# Patient Record
Sex: FEMALE | Race: BLACK OR AFRICAN AMERICAN | HISPANIC OR LATINO
[De-identification: names, ages, dates, MRNs, and addresses within clinical notes are randomized per-mention and may not be internally consistent; named-entity substitution may affect disease eponyms.]

---

## 2017-01-18 ENCOUNTER — TRANSCRIPTION ENCOUNTER (OUTPATIENT)
Age: 47
End: 2017-01-18

## 2018-05-25 ENCOUNTER — APPOINTMENT (OUTPATIENT)
Dept: ORTHOPEDIC SURGERY | Facility: CLINIC | Age: 48
End: 2018-05-25
Payer: COMMERCIAL

## 2018-05-25 VITALS
DIASTOLIC BLOOD PRESSURE: 83 MMHG | HEART RATE: 84 BPM | WEIGHT: 222 LBS | HEIGHT: 67 IN | SYSTOLIC BLOOD PRESSURE: 132 MMHG | BODY MASS INDEX: 34.84 KG/M2

## 2018-05-25 DIAGNOSIS — M79.662 PAIN IN LEFT LOWER LEG: ICD-10-CM

## 2018-05-25 DIAGNOSIS — Z86.39 PERSONAL HISTORY OF OTHER ENDOCRINE, NUTRITIONAL AND METABOLIC DISEASE: ICD-10-CM

## 2018-05-25 DIAGNOSIS — Z86.2 PERSONAL HISTORY OF DISEASES OF THE BLOOD AND BLOOD-FORMING ORGANS AND CERTAIN DISORDERS INVOLVING THE IMMUNE MECHANISM: ICD-10-CM

## 2018-05-25 DIAGNOSIS — Z78.9 OTHER SPECIFIED HEALTH STATUS: ICD-10-CM

## 2018-05-25 PROCEDURE — 99204 OFFICE O/P NEW MOD 45 MIN: CPT

## 2018-05-25 RX ORDER — DICLOFENAC SODIUM 20 MG/G
2 SOLUTION TOPICAL
Qty: 1 | Refills: 0 | Status: ACTIVE | COMMUNITY
Start: 2018-05-25 | End: 1900-01-01

## 2018-05-25 RX ORDER — DICLOFENAC SODIUM 50 MG/1
50 TABLET, DELAYED RELEASE ORAL
Qty: 60 | Refills: 0 | Status: ACTIVE | COMMUNITY
Start: 2018-05-25 | End: 1900-01-01

## 2018-07-18 ENCOUNTER — EMERGENCY (EMERGENCY)
Facility: HOSPITAL | Age: 48
LOS: 1 days | Discharge: HOME | End: 2018-07-18
Attending: EMERGENCY MEDICINE | Admitting: EMERGENCY MEDICINE
Payer: COMMERCIAL

## 2018-07-18 VITALS
SYSTOLIC BLOOD PRESSURE: 123 MMHG | HEART RATE: 89 BPM | OXYGEN SATURATION: 99 % | TEMPERATURE: 98 F | RESPIRATION RATE: 17 BRPM | DIASTOLIC BLOOD PRESSURE: 81 MMHG

## 2018-07-18 VITALS
HEART RATE: 89 BPM | RESPIRATION RATE: 16 BRPM | OXYGEN SATURATION: 99 % | DIASTOLIC BLOOD PRESSURE: 88 MMHG | SYSTOLIC BLOOD PRESSURE: 117 MMHG | TEMPERATURE: 98 F

## 2018-07-18 DIAGNOSIS — Z90.710 ACQUIRED ABSENCE OF BOTH CERVIX AND UTERUS: Chronic | ICD-10-CM

## 2018-07-18 LAB
ALBUMIN SERPL ELPH-MCNC: 4.2 G/DL — SIGNIFICANT CHANGE UP (ref 3.3–5)
ALP SERPL-CCNC: 84 U/L — SIGNIFICANT CHANGE UP (ref 40–120)
ALT FLD-CCNC: 19 U/L — SIGNIFICANT CHANGE UP (ref 4–33)
AST SERPL-CCNC: 25 U/L — SIGNIFICANT CHANGE UP (ref 4–32)
BASOPHILS # BLD AUTO: 0.02 K/UL — SIGNIFICANT CHANGE UP (ref 0–0.2)
BASOPHILS NFR BLD AUTO: 0.3 % — SIGNIFICANT CHANGE UP (ref 0–2)
BILIRUB SERPL-MCNC: 0.2 MG/DL — SIGNIFICANT CHANGE UP (ref 0.2–1.2)
BUN SERPL-MCNC: 11 MG/DL — SIGNIFICANT CHANGE UP (ref 7–23)
CALCIUM SERPL-MCNC: 9.2 MG/DL — SIGNIFICANT CHANGE UP (ref 8.4–10.5)
CHLORIDE SERPL-SCNC: 102 MMOL/L — SIGNIFICANT CHANGE UP (ref 98–107)
CO2 SERPL-SCNC: 25 MMOL/L — SIGNIFICANT CHANGE UP (ref 22–31)
CREAT SERPL-MCNC: 0.95 MG/DL — SIGNIFICANT CHANGE UP (ref 0.5–1.3)
EOSINOPHIL # BLD AUTO: 0.12 K/UL — SIGNIFICANT CHANGE UP (ref 0–0.5)
EOSINOPHIL NFR BLD AUTO: 1.6 % — SIGNIFICANT CHANGE UP (ref 0–6)
GLUCOSE SERPL-MCNC: 162 MG/DL — HIGH (ref 70–99)
HCT VFR BLD CALC: 37.3 % — SIGNIFICANT CHANGE UP (ref 34.5–45)
HGB BLD-MCNC: 11.9 G/DL — SIGNIFICANT CHANGE UP (ref 11.5–15.5)
IMM GRANULOCYTES # BLD AUTO: 0.03 # — SIGNIFICANT CHANGE UP
IMM GRANULOCYTES NFR BLD AUTO: 0.4 % — SIGNIFICANT CHANGE UP (ref 0–1.5)
LIDOCAIN IGE QN: 25.3 U/L — SIGNIFICANT CHANGE UP (ref 7–60)
LYMPHOCYTES # BLD AUTO: 0.6 K/UL — LOW (ref 1–3.3)
LYMPHOCYTES # BLD AUTO: 8.2 % — LOW (ref 13–44)
MAGNESIUM SERPL-MCNC: 2 MG/DL — SIGNIFICANT CHANGE UP (ref 1.6–2.6)
MCHC RBC-ENTMCNC: 27 PG — SIGNIFICANT CHANGE UP (ref 27–34)
MCHC RBC-ENTMCNC: 31.9 % — LOW (ref 32–36)
MCV RBC AUTO: 84.8 FL — SIGNIFICANT CHANGE UP (ref 80–100)
MONOCYTES # BLD AUTO: 0.63 K/UL — SIGNIFICANT CHANGE UP (ref 0–0.9)
MONOCYTES NFR BLD AUTO: 8.6 % — SIGNIFICANT CHANGE UP (ref 2–14)
NEUTROPHILS # BLD AUTO: 5.91 K/UL — SIGNIFICANT CHANGE UP (ref 1.8–7.4)
NEUTROPHILS NFR BLD AUTO: 80.9 % — HIGH (ref 43–77)
NRBC # FLD: 0 — SIGNIFICANT CHANGE UP
PHOSPHATE SERPL-MCNC: 2.1 MG/DL — LOW (ref 2.5–4.5)
PLATELET # BLD AUTO: 234 K/UL — SIGNIFICANT CHANGE UP (ref 150–400)
PMV BLD: 12.3 FL — SIGNIFICANT CHANGE UP (ref 7–13)
POTASSIUM SERPL-MCNC: 5 MMOL/L — SIGNIFICANT CHANGE UP (ref 3.5–5.3)
POTASSIUM SERPL-SCNC: 5 MMOL/L — SIGNIFICANT CHANGE UP (ref 3.5–5.3)
PROT SERPL-MCNC: 7.3 G/DL — SIGNIFICANT CHANGE UP (ref 6–8.3)
RBC # BLD: 4.4 M/UL — SIGNIFICANT CHANGE UP (ref 3.8–5.2)
RBC # FLD: 14.3 % — SIGNIFICANT CHANGE UP (ref 10.3–14.5)
SODIUM SERPL-SCNC: 139 MMOL/L — SIGNIFICANT CHANGE UP (ref 135–145)
WBC # BLD: 7.31 K/UL — SIGNIFICANT CHANGE UP (ref 3.8–10.5)
WBC # FLD AUTO: 7.31 K/UL — SIGNIFICANT CHANGE UP (ref 3.8–10.5)

## 2018-07-18 PROCEDURE — 93010 ELECTROCARDIOGRAM REPORT: CPT

## 2018-07-18 PROCEDURE — 99284 EMERGENCY DEPT VISIT MOD MDM: CPT | Mod: 25

## 2018-07-18 RX ORDER — LEVOTHYROXINE SODIUM 125 MCG
1 TABLET ORAL
Qty: 0 | Refills: 0 | COMMUNITY

## 2018-07-18 RX ORDER — FAMOTIDINE 10 MG/ML
20 INJECTION INTRAVENOUS DAILY
Qty: 0 | Refills: 0 | Status: DISCONTINUED | OUTPATIENT
Start: 2018-07-18 | End: 2018-07-18

## 2018-07-18 RX ORDER — METOCLOPRAMIDE HCL 10 MG
10 TABLET ORAL ONCE
Qty: 0 | Refills: 0 | Status: COMPLETED | OUTPATIENT
Start: 2018-07-18 | End: 2018-07-18

## 2018-07-18 RX ORDER — SODIUM,POTASSIUM PHOSPHATES 278-250MG
2 POWDER IN PACKET (EA) ORAL ONCE
Qty: 0 | Refills: 0 | Status: DISCONTINUED | OUTPATIENT
Start: 2018-07-18 | End: 2018-07-18

## 2018-07-18 RX ORDER — FAMOTIDINE 10 MG/ML
1 INJECTION INTRAVENOUS
Qty: 14 | Refills: 0 | OUTPATIENT
Start: 2018-07-18 | End: 2018-07-31

## 2018-07-18 RX ORDER — ACETAMINOPHEN 500 MG
650 TABLET ORAL ONCE
Qty: 0 | Refills: 0 | Status: COMPLETED | OUTPATIENT
Start: 2018-07-18 | End: 2018-07-18

## 2018-07-18 RX ADMIN — Medication 650 MILLIGRAM(S): at 03:32

## 2018-07-18 RX ADMIN — Medication 30 MILLILITER(S): at 02:29

## 2018-07-18 RX ADMIN — Medication 10 MILLIGRAM(S): at 02:29

## 2018-07-18 RX ADMIN — FAMOTIDINE 20 MILLIGRAM(S): 10 INJECTION INTRAVENOUS at 02:29

## 2018-07-18 NOTE — ED PROVIDER NOTE - MEDICAL DECISION MAKING DETAILS
Dalia PGY3: 47F hx as above presents with abd pain with benign abd exam, likely 2/2 GERD.  Will hydrate, tx symptoms.  Consider imaging. Dalia PGY3: 47F hx as above presents with abd pain with benign abd exam, likely 2/2 GERD.  Also consider cholecystitis, pancreatitis, atypical presentation of ACS.  Check labs.  Will hydrate, tx symptoms.  Consider imaging.

## 2018-07-18 NOTE — ED PROVIDER NOTE - PMH
Graves disease    Hypothyroidism due to medication    Pernicious anemia Atrophic gastritis    Graves disease    Hypothyroidism due to medication    Pernicious anemia

## 2018-07-18 NOTE — ED PROVIDER NOTE - NS ED ROS FT
Gen: denies fever, chills  HENT: denies throat pain, nasal congestion  Eye: denies changes in vision, eye pain  CV: denies chest pain, palpitations  Pulm: denies SOB, cough  Abd: + abd pain, N/V, denies D/C  : denies hematuria, dysuria  Skin: denies rash, itching  Ext: denies LE edema, pain  Neuro: denies HA, dizziness, lightheadedness

## 2018-07-18 NOTE — ED PROVIDER NOTE - ATTENDING CONTRIBUTION TO CARE
47F PMh gastritis, PUD, fibroids s/p hysterectomy, hypothyriod p/w epigastric pain for several hrs, NV. No other systemic symptoms. Vitals wnl, exam as above.  ddx: Likely GERD/gastritis/PUD vs. less likely pancreatitis. clinically not ACS, PE, tamponade, dissection, PTX, perf, myocarditis, mediastinitis.   CBC, cmp, lipase, symptom control, reassess.

## 2018-07-18 NOTE — ED PROVIDER NOTE - PLAN OF CARE
1) Please return to the ED should you have any new or worsening symptoms, worsening pain, develop bloody 1) Please return to the ED should you have any new or worsening symptoms, worsening pain, develop bloody vomiting, bloody stools.  2) Please follow up with your primary doctor in the next 2 weeks.

## 2018-07-18 NOTE — ED ADULT NURSE NOTE - OBJECTIVE STATEMENT
pt arrives a*ox3 amb to room 25 c/o non radiating epigastric abdominal pain that woke her from her sleep at 2300, patient denies any cp or sob, appears comfortable and in nad, resps even and unlabored, 20g ivsl placed labs drawn and sent, md to [facility] closely. pmh hysterectomy and hypothyroid.

## 2018-07-18 NOTE — ED PROVIDER NOTE - FAMILY HISTORY
Mother  Still living? Unknown  Family history of hypertension in mother, Age at diagnosis: Age Unknown     Father  Still living? Unknown  Family history of diabetes mellitus in father, Age at diagnosis: Age Unknown

## 2018-07-18 NOTE — ED PROVIDER NOTE - OBJECTIVE STATEMENT
47F h/o hypothyroidism, uterine fibroids s/p hysterectomy who presents with abdominal pain.    The patient reports 47F h/o hypothyroidism, uterine fibroids s/p hysterectomy, pernicious anemia who presents with abdominal pain.    The patient reports 47F h/o hypothyroidism, uterine fibroids s/p hysterectomy, pernicious anemia, atrophic gastritis who presents with abdominal pain.    The patient reports    PCP: Luann Mazariegos 47F h/o hypothyroidism, uterine fibroids s/p hysterectomy, pernicious anemia, atrophic gastritis who presents with abdominal pain.    The patient reports sudden onset epigastric pain that started around 10-11pm today that woke her from sleep.  She states that the pain is in the central epigastric region, no radiation, pressing in nature, not worsened with exertion, severity 10/10 at its worst and currently 3/10.  The patient had nausea with one episode of emesis that was food-like in consistency without blood.  She denies diarrhea / constipation.  She has a h/o atrophic gastritis but does not take medications - she will take famotidine or tums PRN for pain, which help.  She denies any new restaurants or new food.  She had a hamburger for dinner.  She states that she drinks a lot of coffee but that it has not triggered her gastritis in the past.    PCP: Luann Mazariegos 47F h/o hypothyroidism, uterine fibroids s/p hysterectomy, pernicious anemia, atrophic gastritis who presents with abdominal pain.  The patient reports sudden onset epigastric pain that started around 10-11pm today that woke her from sleep.  She states that the pain is in the central epigastric region, no radiation, pressing in nature, not worsened with exertion, severity 10/10 at its worst and currently 3/10.  The patient had nausea with one episode of emesis that was food-like in consistency without blood.  She denies diarrhea / constipation.  She has a h/o atrophic gastritis but does not take medications - she will take famotidine or tums PRN for pain, which help.  She denies any new restaurants or new food.  She had a hamburger for dinner.  She states that she drinks a lot of coffee but that it has not triggered her gastritis in the past.  PCP: Luann Nixfish: 47F PMh gastritis, PUD, fibroids s/p hysterectomy, hypothyriod p/w epigastric pain for several hrs, intermittent, +similar to prior when diagnosed w/ ulcer. NBNB NV x1. Denies f/c, diarrhea, black/bloody stool, urinary complaints, weakness/numbness, back pain, rashes, recent travel, sick contacts, SOB/CP. normal BMs.    Last EGD ~1yr ago, cant recall name of GI.

## 2018-07-18 NOTE — ED ADULT TRIAGE NOTE - CHIEF COMPLAINT QUOTE
pt. w/ hx. hypothyroidism c/o non- radiating  epigastric pain since 11pm. Pt. states pain woke her up from her sleep. Pt. states since then has had 1x episode of vomiting , and is currently nauseous. Pt. appears uncomfortable in triage . EKG in progress.

## 2018-07-18 NOTE — ED PROVIDER NOTE - PHYSICAL EXAMINATION
T(C): 36.7 (07-18-18 @ 02:32), Max: 36.7 (07-18-18 @ 01:39)  HR: 90 (07-18-18 @ 02:32) (89 - 90)  BP: 121/87 (07-18-18 @ 02:32) (117/88 - 121/87)  RR: 18 (07-18-18 @ 02:32) (16 - 18)  SpO2: 99% (07-18-18 @ 02:32) (99% - 99%)    Gen: awake, alert  HENT: neck soft / supple; MM slightly dry  Lymph: no LAD noted in neck  Eye: PERRL, sclerae anicteric  CV: normal rate, regular rhythm  Pulm: CTAB, no w/r/r auscultated  Abd: +BS, soft, NT, ND  Skin: warm, dry  Ext: no LE edema  Neuro: answering questions appropriately, following commands appropriately, recent and remote memory intact  Psych: normal mood / affect

## 2018-07-18 NOTE — ED PROVIDER NOTE - PROGRESS NOTE DETAILS
Dalia PGY3: Re-assessed pt.  Pt reports feeling much improved, very minimal abd pain.  Likely d/c home. Klepfish: Asymptomatic, abd soft ntnd, labs grossly wnl, given copy of results, comfortable for dc, outpt pmd f/u.

## 2018-07-18 NOTE — ED PROVIDER NOTE - CARE PLAN
Principal Discharge DX:	Abdominal pain  Assessment and plan of treatment:	1) Please return to the ED should you have any new or worsening symptoms, worsening pain, develop bloody Principal Discharge DX:	Abdominal pain  Assessment and plan of treatment:	1) Please return to the ED should you have any new or worsening symptoms, worsening pain, develop bloody vomiting, bloody stools.  2) Please follow up with your primary doctor in the next 2 weeks.

## 2018-09-05 PROBLEM — K29.40 CHRONIC ATROPHIC GASTRITIS WITHOUT BLEEDING: Chronic | Status: ACTIVE | Noted: 2018-07-18

## 2018-10-11 ENCOUNTER — APPOINTMENT (OUTPATIENT)
Dept: GASTROENTEROLOGY | Facility: CLINIC | Age: 48
End: 2018-10-11
Payer: COMMERCIAL

## 2018-10-11 VITALS
RESPIRATION RATE: 16 BRPM | TEMPERATURE: 98.1 F | BODY MASS INDEX: 35 KG/M2 | DIASTOLIC BLOOD PRESSURE: 71 MMHG | SYSTOLIC BLOOD PRESSURE: 143 MMHG | HEIGHT: 67 IN | WEIGHT: 223 LBS | HEART RATE: 84 BPM | OXYGEN SATURATION: 100 %

## 2018-10-11 PROCEDURE — 99204 OFFICE O/P NEW MOD 45 MIN: CPT

## 2019-04-08 ENCOUNTER — RESULT REVIEW (OUTPATIENT)
Age: 49
End: 2019-04-08

## 2019-06-06 ENCOUNTER — APPOINTMENT (OUTPATIENT)
Dept: GASTROENTEROLOGY | Facility: CLINIC | Age: 49
End: 2019-06-06
Payer: COMMERCIAL

## 2019-06-06 VITALS
DIASTOLIC BLOOD PRESSURE: 89 MMHG | HEIGHT: 67 IN | OXYGEN SATURATION: 99 % | WEIGHT: 212 LBS | SYSTOLIC BLOOD PRESSURE: 138 MMHG | HEART RATE: 82 BPM | TEMPERATURE: 98.6 F | BODY MASS INDEX: 33.27 KG/M2

## 2019-06-06 PROCEDURE — 99215 OFFICE O/P EST HI 40 MIN: CPT

## 2019-06-06 NOTE — PHYSICAL EXAM
[General Appearance - Alert] : alert [General Appearance - In No Acute Distress] : in no acute distress [Sclera] : the sclera and conjunctiva were normal [Extraocular Movements] : extraocular movements were intact [PERRL With Normal Accommodation] : pupils were equal in size, round, and reactive to light [Outer Ear] : the ears and nose were normal in appearance [Oropharynx] : the oropharynx was normal [Neck Appearance] : the appearance of the neck was normal [Auscultation Breath Sounds / Voice Sounds] : lungs were clear to auscultation bilaterally [Heart Rate And Rhythm] : heart rate was normal and rhythm regular [Heart Sounds] : normal S1 and S2 [Heart Sounds Gallop] : no gallops [Murmurs] : no murmurs [Heart Sounds Pericardial Friction Rub] : no pericardial rub [Bowel Sounds] : normal bowel sounds [Abdomen Soft] : soft [Abdomen Tenderness] : non-tender [Abdomen Mass (___ Cm)] : no abdominal mass palpated [Cervical Lymph Nodes Enlarged Posterior Bilaterally] : posterior cervical [Cervical Lymph Nodes Enlarged Anterior Bilaterally] : anterior cervical [Supraclavicular Lymph Nodes Enlarged Bilaterally] : supraclavicular [Nail Clubbing] : no clubbing  or cyanosis of the fingernails [Oriented To Time, Place, And Person] : oriented to person, place, and time [] : no rash [Impaired Insight] : insight and judgment were intact [Affect] : the affect was normal

## 2019-06-06 NOTE — HISTORY OF PRESENT ILLNESS
[de-identified] : 48 year old female presents for follow up.  1) She reports  a history of intestinal metaplasia on an EGD and was told by Dr. Larios to have  repeat EGD. 2) Her GERD has drastically improved with diet change and weight loss. Still, she complains of occasional symptoms despite talking Prilosec occasionally 3) She complains of occasional constipation and rectal pain.

## 2019-06-06 NOTE — ASSESSMENT
[FreeTextEntry1] : 48 year old female presents for follow up\par \par 1) For her IM she will follow up with [facility] for EGD and intestinal mapping \par \par 2) For HEr GERD : I explained how PPI and H2 blockers. I explained for immediate relief she should take a H2 blocker \par \par 3) Constipation: \par Colace+ miralax+ Benefiber as needed. \par \par RTO as needed

## 2019-11-08 NOTE — ED PROVIDER NOTE - RADIATION
no radiation You can access the FollowMyHealth Patient Portal offered by [facility] by registering at the following website: [email or web address]. By joining [employer]’s FollowMyHealth portal, you will also be able to view your health information using other applications (apps) compatible with our system.

## 2022-05-17 ENCOUNTER — APPOINTMENT (OUTPATIENT)
Dept: ORTHOPEDIC SURGERY | Facility: CLINIC | Age: 52
End: 2022-05-17
Payer: COMMERCIAL

## 2022-05-17 ENCOUNTER — NON-APPOINTMENT (OUTPATIENT)
Age: 52
End: 2022-05-17

## 2022-05-17 VITALS
HEART RATE: 89 BPM | SYSTOLIC BLOOD PRESSURE: 126 MMHG | DIASTOLIC BLOOD PRESSURE: 86 MMHG | WEIGHT: 208 LBS | HEIGHT: 67 IN | BODY MASS INDEX: 32.65 KG/M2

## 2022-05-17 DIAGNOSIS — M22.2X2 PATELLOFEMORAL DISORDERS, LEFT KNEE: ICD-10-CM

## 2022-05-17 PROCEDURE — 99203 OFFICE O/P NEW LOW 30 MIN: CPT

## 2022-05-17 PROCEDURE — 73560 X-RAY EXAM OF KNEE 1 OR 2: CPT | Mod: LT

## 2022-05-17 RX ORDER — DICLOFENAC SODIUM 50 MG/1
50 TABLET, DELAYED RELEASE ORAL
Qty: 60 | Refills: 1 | Status: ACTIVE | COMMUNITY
Start: 2022-05-17 | End: 1900-01-01

## 2022-05-17 NOTE — HISTORY OF PRESENT ILLNESS
[de-identified] : CC Left knee\par \par HPI 52 yo F presents with acute onset of ACTIVITY pain in the anterior left knee after fall on anteriuo knee.\par The pain is BETTER, and rated a 2 out of 10, described as pinch sharp, WITHOUT RADIATION.\par rest makes the pain better and stairs and sitting makes the pain worse.\par The patient reports associated symptoms of swelling.\par The patient DENIES pain at night affecting sleep, and DENIES similar pain previously.\par \par The patient has tried the following treatments:\par Activity modification	+\par Ice/Compression  	+\par Braces    		+\par Nsaids    		+\par Physical Therapy 	+ 3 sessions\par Cortisone Injection	-\par Visco Injection		-\par Zilretta			-\par Arthroscopy		-\par \par Review of Systems is positive for the above musculoskeletal symptoms and is otherwise non-contributory for general, constitutional, psychiatric, neurologic, HEENT, cardiac, respiratory, gastrointestinal, reproductive, lymphatic, and dermatologic complaints.\par \par Consult by Dr Glesaon\par \par Physical Examination\par General: well nourished, in no acute distress, alert and oriented to person, place and time\par Psychiatric: normal mood and affect, no abnormal movements or speech patterns\par Eyes: vision intact WITH glasses\par \par Musculoskeletal Examination\par Ambulation	NO antalgic gait, NO assistive devices\par \par Knee			Right			Left\par General\par      Swelling/Deformity	normal			normal	\par      Skin			normal			normal\par      Erythema		-			-\par      Standing Alignment	neutral			neutral\par      Effusion		none			none\par Range of Motion\par      Hip			full painless ROM		full painless ROM\par      Knee Flexion		130			130\par      Knee Extension	0			0\par Patella\par      J Sign		-			-\par      Quad Medial/Lateral	1/1 1/1\par      Apprehension		-			-\par      Nathaniel's		-			-\par      Grind Sign		-			-\par      Crepitus		-			+\par Palpation\par      Medial Joint Line	-			-\par      Medial Fem Condyle	-			-\par      Lateral Joint Line	-			-\par      Quad Tendon		-			-\par      Patella Tendon	-			-\par      Medial Patella		-			-\par      Lateral Patella 	-			-\par      Posterior Knee	-			-\par Ligamentous\par      Varus @ 0° / 30°	-/-			-/-\par      Valgus @ 0° / 30°	-/-			-/-\par      Lachman		-			-\par      Pivot Shift		-			-\par      Anterior Drawer	-			-\par      Posterior Drawer	-			-\par Meniscus\par      Enrique		-			-\par      Flexion Pinch		-			-\par Strength Examination/Atrophy\par      Hip Flexors 		5+			5+\par      Quadriceps		5+			5+\par      Hamstring		5+			5+\par      Tibialis Anterior	5+			5+\par      Achilles/Soleus	5+			5+\par Sensation\par      Deep Peroneal	normal			normal\par      Superficial Peroneal 	normal			normal\par      Sural  		normal			normal\par      Posterior Tibial 	normal			normal\par      Saphneous 		normal			normal\par Pulses\par      DP			2+			2+\par \par 4 views of the affected [L] knee (standing AP, flexing standing AP, 30degree flexed lateral, sunrise view)\par were ordered, obtained and evaluated by myself today and\par demonstrate:\par no narrowing\par no osteophytic lipping\par no suprapatellar effusion\par mild patellofemoral joint space loss [without] evidence of tilt [or] subluxation on sunrise view\par Normal soft tissue density\par Otherwise normal osseous bone structure without fracture or dislocation\par \par \par left knee traumatic patellofemoral syndrome\par \par pain much better\par \par pt\par \par diclofenac oral and topical\par \par activity modification\par \par fu pen

## 2022-06-22 ENCOUNTER — RX RENEWAL (OUTPATIENT)
Age: 52
End: 2022-06-22

## 2022-06-22 RX ORDER — DICLOFENAC SODIUM 20 MG/G
2 SOLUTION TOPICAL
Qty: 1 | Refills: 0 | Status: ACTIVE | COMMUNITY
Start: 2022-05-17 | End: 1900-01-01